# Patient Record
Sex: FEMALE | Race: BLACK OR AFRICAN AMERICAN | NOT HISPANIC OR LATINO | URBAN - METROPOLITAN AREA
[De-identification: names, ages, dates, MRNs, and addresses within clinical notes are randomized per-mention and may not be internally consistent; named-entity substitution may affect disease eponyms.]

---

## 2019-06-05 ENCOUNTER — EMERGENCY (EMERGENCY)
Facility: HOSPITAL | Age: 54
LOS: 1 days | Discharge: HOME | End: 2019-06-05
Admitting: EMERGENCY MEDICINE
Payer: OTHER MISCELLANEOUS

## 2019-06-05 VITALS
OXYGEN SATURATION: 98 % | DIASTOLIC BLOOD PRESSURE: 87 MMHG | TEMPERATURE: 98 F | SYSTOLIC BLOOD PRESSURE: 127 MMHG | HEART RATE: 79 BPM | RESPIRATION RATE: 16 BRPM

## 2019-06-05 PROCEDURE — 73610 X-RAY EXAM OF ANKLE: CPT | Mod: 26,LT

## 2019-06-05 PROCEDURE — 73630 X-RAY EXAM OF FOOT: CPT | Mod: 26,LT

## 2019-06-05 PROCEDURE — 99283 EMERGENCY DEPT VISIT LOW MDM: CPT

## 2019-06-05 RX ORDER — IBUPROFEN 200 MG
600 TABLET ORAL ONCE
Refills: 0 | Status: COMPLETED | OUTPATIENT
Start: 2019-06-05 | End: 2019-06-05

## 2019-06-05 RX ADMIN — Medication 600 MILLIGRAM(S): at 22:55

## 2019-06-05 NOTE — ED PROVIDER NOTE - CLINICAL SUMMARY MEDICAL DECISION MAKING FREE TEXT BOX
Pt is a 52 y/o F with no significant PMHx who presents to the ED c/o left ankle pain s/p slip and fall injury today. Will obtain XR to r/o fx. Likely sprain. Plan for ACE bandage and f/u with ortho.

## 2019-06-05 NOTE — ED PROVIDER NOTE - OBJECTIVE STATEMENT
Pt is a 52 y/o F with no significant PMHx who presents to the ED c/o left ankle pain s/p slip and fall injury today. 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 She denies falling to the ground, dizziness, LOC, HA, CP, SOB, and abd pain. She does report a tingling sensation at the top of her left foot and seeing stars after injury, which has now resolved.

## 2019-06-05 NOTE — ED PROVIDER NOTE - NSFOLLOWUPINSTRUCTIONS_ED_ALL_ED_FT
Follow up with your PMD within 48-72 hours.  Recommend ortho/ podiatry follow up within the week.  Rest, ice and elevate.  Take Motrin 600mg every 8hrs for pain with food. Worsening pain, swelling, numbness, weakness return to ER

## 2019-06-05 NOTE — ED ADULT TRIAGE NOTE - CHIEF COMPLAINT QUOTE
pt BIBEMS c/o L ankle pain s/p twisting it while walking at work. denies any head trauma, denies LOC, did not fall, caught herself with her hands. ankle wrapped with ice pack and gauze by EMS.

## 2019-06-05 NOTE — ED PROVIDER NOTE - MUSCULOSKELETAL, MLM
Left lower extremity with pulses intact and full ROM. Swelling and TTP of the lateral malleolus. Tenderness to forefoot but with full ROM. Strength and sensation intact.

## 2020-08-07 NOTE — ED PROVIDER NOTE - PMH
No pertinent past medical history <<----- Click to add NO pertinent Past Medical History Star Wedge Flap Text: The defect edges were debeveled with a #15 scalpel blade.  Given the location of the defect, shape of the defect and the proximity to free margins a star wedge flap was deemed most appropriate.  Using a sterile surgical marker, an appropriate rotation flap was drawn incorporating the defect and placing the expected incisions within the relaxed skin tension lines where possible. The area thus outlined was incised deep to adipose tissue with a #15 scalpel blade.  The skin margins were undermined to an appropriate distance in all directions utilizing iris scissors.
